# Patient Record
(demographics unavailable — no encounter records)

---

## 2025-03-06 NOTE — HISTORY OF PRESENT ILLNESS
[FreeTextEntry1] : Mook Cruz is a 64 year old male who presents to establish care for Diabetes Mellitus Type 2 and hypothyroidism. Referred by: Cardiologist Dr. Leilani Chowdary   PMHx: MI (06/2024), CKD, DM, HTN, HLD PSHx: PCI w/3 stents (06/2024), hand surgery as a child, elbow surgery FMHx: HTN, CAD, DM, lung cancer NKDA   Diagnosed with pre-diabetes in 2023 on bloodwork He did not start medication at that time, but saw dietician and changed diet.  Was told DM had worsened after MI in June 2024 and at this time he was started on Metformin, Januvia, Jardiance Started using CGM in Summer 2024 -- he requested to use it because he didn't like fingersticks. Likes CGM use Has since changed carb intake a little bit  Endorses frequent urination (3x/night), dry mouth. Denies blurry vision Endorses recent dizziness when resting and some left arm pain at rest. No chest pain. only SOB when climbing stairs -- intending to follow up with PCP/cardiologist about this soon Previously wore a Holter monitor for 1 week as he felt palpitations and was told that everything was ok, no tachycardia  FS in office: 160 -- ~1.5hrs PPBS, had fruit cereal at 1pm   Current Regimen: -- Metformin 500mg BID  -- Januvia 100mg daily -- Jardiance 10mg daily  Previous regimens:  None   Mook checks blood glucose with Freestyle Steve 2 CGM FBS ~106 Hypoglycemia: None noted on CGM review  Date of report download: 2/13/25 - 2/26/25 % time with CGM in use: 55%   Time high: 15% (time BG >251: 0%) Time in range: 85% Time low: 0% (time BG <54: 0%)   Average glucose: 145 mg/dL CGM analysis reveals generally well controlled glucose, with postprandial excursions and glucose that returns to baseline overnight.   - Diet: Breakfast - cup of black coffee Lunch - salad or baked chicken Dinner - depends on how he feels, may have steak or burger  No soda/juice, just seltzer No snacks - Exercise: Walks a lot, and does stationary bike   Complications: + peripheral neuropathy, CAD s/p MI with PCI in June 2024, CKD stage 2   Ophthalmologist: January 2025, no  Podiatrist: Sees every 3 months, next appt is upcoming   Diagnosed with hypothyroidism in 1996 on bloodwork Started taking Levothyroxine 125mcg daily after diagnosis Dose increased but this caused low TSH so went back to 125mcg daily and has been taking that since  Current regimen: Levothyroxine 125mcg daily  No knowledge of antibodies Has never had thyroid US Mother had abnormal thyroid function/goiter (but thinks not cancer) Denies personal/family history of thyroid cancer No radiation exposure No known amiodarone exposure  Symptoms: endorses hair loss (though thinks d/t age), dry skin, unintentional weight gain.  Denies cold intolerance, constipation, fatigue, bradycardia  No history of other autoimmune conditions No obstructive symptoms of thyroid disease such as difficulty swallowing, difficulty breathing, voice changes   Social Hx: Occasional glass of wine (social) No nicotine, drug use. 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

## 2025-03-06 NOTE — HISTORY OF PRESENT ILLNESS
[FreeTextEntry1] : Mook Cruz is a 64 year old male who presents to establish care for Diabetes Mellitus Type 2 and hypothyroidism. Referred by: Cardiologist Dr. Leilani Chowdary   PMHx: MI (06/2024), CKD, DM, HTN, HLD PSHx: PCI w/3 stents (06/2024), hand surgery as a child, elbow surgery FMHx: HTN, CAD, DM, lung cancer NKDA   Diagnosed with pre-diabetes in 2023 on bloodwork He did not start medication at that time, but saw dietician and changed diet.  Was told DM had worsened after MI in June 2024 and at this time he was started on Metformin, Januvia, Jardiance Started using CGM in Summer 2024 -- he requested to use it because he didn't like fingersticks. Likes CGM use Has since changed carb intake a little bit  Endorses frequent urination (3x/night), dry mouth. Denies blurry vision Endorses recent dizziness when resting and some left arm pain at rest. No chest pain. only SOB when climbing stairs -- intending to follow up with PCP/cardiologist about this soon Previously wore a Holter monitor for 1 week as he felt palpitations and was told that everything was ok, no tachycardia  FS in office: 160 -- ~1.5hrs PPBS, had fruit cereal at 1pm   Current Regimen: -- Metformin 500mg BID  -- Januvia 100mg daily -- Jardiance 10mg daily  Previous regimens:  None   Mook checks blood glucose with Freestyle Steve 2 CGM FBS ~106 Hypoglycemia: None noted on CGM review  Date of report download: 2/13/25 - 2/26/25 % time with CGM in use: 55%   Time high: 15% (time BG >251: 0%) Time in range: 85% Time low: 0% (time BG <54: 0%)   Average glucose: 145 mg/dL CGM analysis reveals generally well controlled glucose, with postprandial excursions and glucose that returns to baseline overnight.   - Diet: Breakfast - cup of black coffee Lunch - salad or baked chicken Dinner - depends on how he feels, may have steak or burger  No soda/juice, just seltzer No snacks - Exercise: Walks a lot, and does stationary bike   Complications: + peripheral neuropathy, CAD s/p MI with PCI in June 2024, CKD stage 2   Ophthalmologist: January 2025, no  Podiatrist: Sees every 3 months, next appt is upcoming   Diagnosed with hypothyroidism in 1996 on bloodwork Started taking Levothyroxine 125mcg daily after diagnosis Dose increased but this caused low TSH so went back to 125mcg daily and has been taking that since  Current regimen: Levothyroxine 125mcg daily  No knowledge of antibodies Has never had thyroid US Mother had abnormal thyroid function/goiter (but thinks not cancer) Denies personal/family history of thyroid cancer No radiation exposure No known amiodarone exposure  Symptoms: endorses hair loss (though thinks d/t age), dry skin, unintentional weight gain.  Denies cold intolerance, constipation, fatigue, bradycardia  No history of other autoimmune conditions No obstructive symptoms of thyroid disease such as difficulty swallowing, difficulty breathing, voice changes   Social Hx: Occasional glass of wine (social) No nicotine, drug use. 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

## 2025-03-06 NOTE — ASSESSMENT
[FreeTextEntry1] : Type 2 DM, well controlled, with complications including CAD (s/p MI in 2024), peripheral neuropathy  Current regimen: Metformin 500mg BID, Januvia 100mg daily, Jardiance 10mg daily  Mook's DM is generally well controlled, CGM review indicates time in range is 85% with above regimen. However, given history of MI and recent history of rising serum creatinine, would make sense to switch from Januvia to GLP-1 for greater cardiac/renal protection and continue Metformin, Jardiance as currently taking. Mook is amenable to this plan. We reviewed how to use the Ozempic and Trulicity pens, how medications work, and strategies for mitigation of side effects.   Goal A1c <7%, A1c 6.1% in January 2025 Goal BP <130/80, BP today 117/80, takes metoprolol, spironolactone, losartan daily Goal LDL <100mg/dL, recent LDL unknown, on atorvastatin 80mg daily  Plan -- Labs today, will call w/results -- Continue Metformin 500mg BID, Jardiance 10mg daily -- Will likely start Ozempic 0.25mg weekly (reviewed pen use, strategies for mitigation of side effects) and stop Januvia (will call after labs result and discuss plan) -- Continue CGM use -- indicated as he needs good glucose control after MI -- Follow up with MD in 3 months, with myself in 7 months.  2.  Hypothyroidism, diagnosed around 1996 Currently taking levothyroxine 125mcg daily (same dose since diagnosis) Weight 122kg -- currently taking ~1mcg/kg daily Endorses recent weight gain, dry skin Last TSH 14.3 (1/9/25) Right lobe enlarged on palpation  Plan -- Labs today, will call w/results -- Continue levothyroxine 125mcg daily until call w/labs (will change dose if TSH still elevated -- Follow up in 3 months with MD, in 7 months with myself

## 2025-03-06 NOTE — ADDENDUM
[FreeTextEntry1] : 2/27/25 Spoke w/pt about results of labs from 2/26/25 A1c 6.5% with Jardiance/Januvia/metformin -- he is amenable to stopping Januvia, switching to Ozempic TSH 7.29 with normal FT4/TT3. Will increase Levothyroxine from 125mcg 7 days/week to 125mcg 7 days/week and an additional half tablet on Sundays (for total of 937.5mcg weekly (~134mcg daily))  + TPO/Tg Ab CMP -- serum creatinine 1.34, GFR 59, otherwise normal LDL 80, ideally would like it closer to goal of <55. He will speak w/cardiologist. May also see some improvement with improvement in TSH? normal CBC no Albuminuria Planning for follow up in May with Dr. Mohamud  3/6/25 Spoke w/pharmacy, they let me know he is actually taking metformin 1g BID. This is ok as recent GFR>45

## 2025-03-06 NOTE — PHYSICAL EXAM
[Alert] : alert [No Acute Distress] : no acute distress [EOMI] : extra ocular movement intact [Normal Hearing] : hearing was normal [No Respiratory Distress] : no respiratory distress [No Accessory Muscle Use] : no accessory muscle use [Normal Rate and Effort] : normal respiratory rate and effort [Clear to Auscultation] : lungs were clear to auscultation bilaterally [Normal S1, S2] : normal S1 and S2 [Normal Rate] : heart rate was normal [Pedal Pulses Normal] : the pedal pulses are present [Normal Bowel Sounds] : normal bowel sounds [Not Tender] : non-tender [Not Distended] : not distended [Soft] : abdomen soft [Spine Straight] : spine straight [No Stigmata of Cushings Syndrome] : no stigmata of Cushings Syndrome [Normal Gait] : normal gait [No Joint Swelling] : no joint swelling seen [Swelling] : swollen [Normal] : normal [2+] : 2+ in the dorsalis pedis [Oriented x3] : oriented to person, place, and time [Normal Insight/Judgement] : insight and judgment were intact [Kyphosis] : no kyphosis present [de-identified] : right thyroid lobe enlarged [de-identified] : 1+ edema to B/L ankles [FreeTextEntry1] : ruddiness/discoloration of foot [FreeTextEntry2] : + dry skin [FreeTextEntry4] : Monofilament testing reveals decreased sensation in ball of right foot [FreeTextEntry6] : + dry skin [FreeTextEntry8] : Monofilament testing reveals generally intact sensation

## 2025-03-06 NOTE — PHYSICAL EXAM
[Alert] : alert [No Acute Distress] : no acute distress [EOMI] : extra ocular movement intact [Normal Hearing] : hearing was normal [No Respiratory Distress] : no respiratory distress [No Accessory Muscle Use] : no accessory muscle use [Normal Rate and Effort] : normal respiratory rate and effort [Clear to Auscultation] : lungs were clear to auscultation bilaterally [Normal S1, S2] : normal S1 and S2 [Normal Rate] : heart rate was normal [Pedal Pulses Normal] : the pedal pulses are present [Normal Bowel Sounds] : normal bowel sounds [Not Tender] : non-tender [Not Distended] : not distended [Soft] : abdomen soft [Spine Straight] : spine straight [No Stigmata of Cushings Syndrome] : no stigmata of Cushings Syndrome [Normal Gait] : normal gait [No Joint Swelling] : no joint swelling seen [Swelling] : swollen [Normal] : normal [2+] : 2+ in the dorsalis pedis [Oriented x3] : oriented to person, place, and time [Normal Insight/Judgement] : insight and judgment were intact [Kyphosis] : no kyphosis present [de-identified] : right thyroid lobe enlarged [de-identified] : 1+ edema to B/L ankles [FreeTextEntry1] : ruddiness/discoloration of foot [FreeTextEntry2] : + dry skin [FreeTextEntry4] : Monofilament testing reveals decreased sensation in ball of right foot [FreeTextEntry6] : + dry skin [FreeTextEntry8] : Monofilament testing reveals generally intact sensation

## 2025-03-06 NOTE — REVIEW OF SYSTEMS
[Recent Weight Gain (___ Lbs)] : recent weight gain: [unfilled] lbs [Nocturia] : nocturia [Joint Pain] : joint pain [As Noted in HPI] : as noted in HPI [Dizziness] : dizziness [Polydipsia] : polydipsia [Negative] : Psychiatric [FreeTextEntry9] : knees

## 2025-03-06 NOTE — HISTORY OF PRESENT ILLNESS
[FreeTextEntry1] : Mook Cruz is a 64 year old male who presents to establish care for Diabetes Mellitus Type 2 and hypothyroidism. Referred by: Cardiologist Dr. Leilani Chowdary   PMHx: MI (06/2024), CKD, DM, HTN, HLD PSHx: PCI w/3 stents (06/2024), hand surgery as a child, elbow surgery FMHx: HTN, CAD, DM, lung cancer NKDA   Diagnosed with pre-diabetes in 2023 on bloodwork He did not start medication at that time, but saw dietician and changed diet.  Was told DM had worsened after MI in June 2024 and at this time he was started on Metformin, Januvia, Jardiance Started using CGM in Summer 2024 -- he requested to use it because he didn't like fingersticks. Likes CGM use Has since changed carb intake a little bit  Endorses frequent urination (3x/night), dry mouth. Denies blurry vision Endorses recent dizziness when resting and some left arm pain at rest. No chest pain. only SOB when climbing stairs -- intending to follow up with PCP/cardiologist about this soon Previously wore a Holter monitor for 1 week as he felt palpitations and was told that everything was ok, no tachycardia  FS in office: 160 -- ~1.5hrs PPBS, had fruit cereal at 1pm   Current Regimen: -- Metformin 500mg BID  -- Januvia 100mg daily -- Jardiance 10mg daily  Previous regimens:  None   Mook checks blood glucose with Freestyle Steve 2 CGM FBS ~106 Hypoglycemia: None noted on CGM review  Date of report download: 2/13/25 - 2/26/25 % time with CGM in use: 55%   Time high: 15% (time BG >251: 0%) Time in range: 85% Time low: 0% (time BG <54: 0%)   Average glucose: 145 mg/dL CGM analysis reveals generally well controlled glucose, with postprandial excursions and glucose that returns to baseline overnight.   - Diet: Breakfast - cup of black coffee Lunch - salad or baked chicken Dinner - depends on how he feels, may have steak or burger  No soda/juice, just seltzer No snacks - Exercise: Walks a lot, and does stationary bike   Complications: + peripheral neuropathy, CAD s/p MI with PCI in June 2024, CKD stage 2   Ophthalmologist: January 2025, no  Podiatrist: Sees every 3 months, next appt is upcoming   Diagnosed with hypothyroidism in 1996 on bloodwork Started taking Levothyroxine 125mcg daily after diagnosis Dose increased but this caused low TSH so went back to 125mcg daily and has been taking that since  Current regimen: Levothyroxine 125mcg daily  No knowledge of antibodies Has never had thyroid US Mother had abnormal thyroid function/goiter (but thinks not cancer) Denies personal/family history of thyroid cancer No radiation exposure No known amiodarone exposure  Symptoms: endorses hair loss (though thinks d/t age), dry skin, unintentional weight gain.  Denies cold intolerance, constipation, fatigue, bradycardia  No history of other autoimmune conditions No obstructive symptoms of thyroid disease such as difficulty swallowing, difficulty breathing, voice changes   Social Hx: Occasional glass of wine (social) No nicotine, drug use. 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

## 2025-03-06 NOTE — DATA REVIEWED
[FreeTextEntry1] : 1/9/25 serum creatinine 1.28, GFR 62 AST 18, ALT 20, Alk Phos 76 TSH 14.3  9/19/24 A1c 6.1% TSH 11.7, FT4 1.53 Serum creatinine 1.27, GFR 63 Alk phos 91, AST 26, ALT 48